# Patient Record
(demographics unavailable — no encounter records)

---

## 2024-10-09 NOTE — HISTORY OF PRESENT ILLNESS
[de-identified] : This patient presents today for initial consultation regarding complaints of left shoulder pain.  Patient had the pain for about 1 month.  Pain level 4-5 out of 10 but increased to 6 out of 10 with activity.  Reaching behind the back is painful and abducting the arm to the side is also quite painful.  The patient states that the pain is worse with activity and improved by rest. The patient denies numbness and tingling, radicular symptoms, or bowel/bladder dysfunction.  Patient takes Tylenol for the pain.  Has not had any treatment for this issue.

## 2024-10-09 NOTE — DISCUSSION/SUMMARY
[de-identified] : This patient presents for initial consultation by complaints of left shoulder pain.  Patient had the pain for about 1 month.  He has been taking Tylenol without improvement.  He has been doing a lot of manual activity cleaning at his mom's house and throwing away garbage.  This may have contributed to his impingement that we are seeing on today's office visit.  We discussed treatment options.  He is not been taking any anti-inflammatory so I recommend a course of meloxicam 15 mg each day for 2 weeks.  Instructions are given regarding taking meloxicam.  If the symptoms do not improve in the next 2 weeks I recommend MRI for further evaluation.  We will consider cortisone injection physical therapy if the MRI is negative for any rotator cuff tears.  At least 30 minutes was spent performing the evaluation and management on today's office visit.  This includes but is not limited to preparing to see patient including review of any test results or outside medical records, obtaining and/or reviewing separately obtained history, performing examination and evaluation, counseling and educating the patient on their diagnosis and treatment recommendations, ordering medications, tests, or procedures, documenting clinical information in the electronic health record, independently interpreting results (not separately reported) and communicating results to the patient, and coordination of care.

## 2024-10-09 NOTE — PHYSICAL EXAM
[de-identified] : The patient appears well nourished  and in no apparent distress.  The patient is alert and oriented to person, place, and time.   Affect and mood appear normal.    The head is normocephalic and atraumatic.  The eyes reveal normal sclera and extra ocular muscles are intact.   The neck appears normal with no jugular venous distention or masses noted.   Skin shows normal turgor with no evidence of eczema or psoriasis.  No respiratory distress noted.  The patient ambulates with a normal gait.  The left shoulder has satisfactory range of motion but there is some guarding and discomfort with terminal internal rotation and terminal abduction.  There is a positive impingement sign and positive Gleason sign..   Rotator cuff strength is normal.  There is no tenderness to palpation.   There is no soft tissue swelling.  There is no eccyhmosis.  There is no warmth or erythema.    There is no instabililty on exam to anterior drawer or load and shift.  No lymphadenopathy or edema is noted.  Pulses and capillary refill are normal.  There is no muscular atrophy.  Strength and sensation are intact distally.   [de-identified] : AP, outlet,  and glenoid and axillary views of the left shoulder were obtained.  The glenohumeral and acromioclavicular joints are well maintained without evidence of degenerative arthritis.   There is no fracture, dislocation, or subluxation.

## 2024-12-20 NOTE — HISTORY OF PRESENT ILLNESS
[Upper back] : upper back [Right Leg] : right leg [4] : 4 [Constant] : constant [Household chores] : household chores [Sleep] : sleep [Sitting] : sitting [Standing] : standing [Full time] : Work status: full time [] : no [FreeTextEntry1] : RT SIDE GREATER LT [FreeTextEntry6] : SHOCKING [FreeTextEntry7] : RT LEG GREATER THAN LT  [de-identified] : 2018 [de-identified] : PTA WAS COMPELETE IN 2018 FOR 1 YEAR OF SEASSION WITH NO IMPROVEMENT IN PAIN

## 2025-03-05 NOTE — HISTORY OF PRESENT ILLNESS
[Lower back] : lower back [7] : 7 [Constant] : constant [Rest] : rest [Meds] : meds [Walking] : walking [Full time] : Work status: full time [] : This patient has had an injection before: yes [Home] : at home, [unfilled] , at the time of the visit. [Medical Office: ([facility])___] : at the medical office located in  [Telehealth (audio & video)] : This visit was provided via telehealth using real-time 2-way audio visual technology. [Verbal consent obtained from patient] : the patient, [unfilled] [FreeTextEntry1] : RT KNEE [de-identified] : W DR STODDARD IN KNEE

## 2025-03-10 NOTE — HISTORY OF PRESENT ILLNESS
[de-identified] : Patient is a 72-year-old male who presents today for an evaluation regarding his right knee.  He states that the pain started 1 week ago on Thursday.  While trying to reach something in his truck.  He did feel a sharp pain within the right knee.  And felt some swelling the following day.  He states that since then he has had a discomfort with a slight increase in pain and swelling.  Which is worse with any strenuous activities.  Which would include standing and walking.  He did try icing it as well as taking Tylenol.  Which did provide him with some mild relief.  Currently he states the pain is a 5 on a scale of 1-10.  Denies any buckling or locking.  And states that he was doing well prior to Thursday.

## 2025-03-10 NOTE — PHYSICAL EXAM
[de-identified] : On physical examination of the right knee.  The skin is clean dry and intact with no evidence of infection superficial or deep.  There is no redness heat discharge fever or increased pain and tenderness.  However there is some joint effusion noted.  Overall he does have excellent range of motion.  There is some pain and tenderness are mostly on the medial aspect.  Neurovascularly intact with no evidence of ligamentous laxity.  However performing a squat and Alvaro's test elicited an increase in discomfort on the medial aspect.  Consistent with a possible medial meniscal tear.  Actively he does have excellent range of motion with no palpable defect nor weakness. [de-identified] : X-rays of the right knee were taken in the office today.  This included the 3 views the AP lateral and sunrise views.  It shows some mild narrowing of the joint spacing particularly on the medial femoral-tibial compartment.  No evidence of any fracture or dislocation.

## 2025-03-10 NOTE — PROCEDURE
[Aspiration] : Aspiration [Injection] : Injection [Right] : of the right [Knee Joint] : knee joint [Effusion] : Effusion [Patient] : patient [Risk] : risk [Benefits] : benefits [Alternatives] : alternatives [Bleeding] : bleeding [Infection] : infection [Allergic Reaction] : allergic reaction [Verbal Consent Obtained] : verbal consent was obtained prior to the procedure [Alcohol] : Alcohol [Lateral] : lateral [Superior] : superior [18] : an 18-gauge [___ mL Fluid] : [unfilled] mL of [Yellow] : yellow [Same Needle/New Syringe] : the syringe was changed and the same needle was left in place and [1% Lidocaine___(mL)] : [unfilled] mL of 1% Lidocaine [Methylpred. 40mg/mL___(mL)] : [unfilled] mL of 40mg/mL methylprednisolone [Bandage Applied] : a bandage [Ace Wrap] : an ace wrap [Tolerated Well] : The patient tolerated the procedure well [None] : none

## 2025-03-10 NOTE — DISCUSSION/SUMMARY
[de-identified] : After thorough history full examination and review of x-rays.  It was explained to the patient that he does have some mild narrowing on the medial joint line however the examination is compatible with a possible medial meniscal tear.  Since he did have some joint effusion with some discomfort.  He was offered a cortisone injection here in the office as well as an aspiration.  He was explained the risk benefits pros and cons as well as alternatives.  He was explained that there is no guarantee for complete relief and that if there is any relief that this may return.  He was explained that an injection is not a cure for any osteoarthritic changes nor any meniscal tear.  And therefore is used as a temporary basis to help control his symptoms.  He was explained that if this returns or persists.  An MRI of the knee is warranted to fully evaluate the articular cartilage and to assess for any meniscus tear.  He was explained that following the injection he should reassess his discomfort and if still persistent receive the MRI.  He was however advised to continue with conservative management as well including ice packs/moist heat and anti-inflammatories as needed.  35 minutes were spent, face to face, in direct consultation with the patient. This includes reviewing the natural history of their Dx., eliciting the history, performing an orthopedic exam, review of the x-ray findings, forming a differential Dx and discussing all treatment options. This Includes both surgical and non-surgical treatments. I also reviewed all the risks and benefits of non-operative & operative Tx options, future impact into orthopedic functions/problems, activity restrictions both at home and at work, and all follow up requirements.

## 2025-04-14 NOTE — HISTORY OF PRESENT ILLNESS
[Lower back] : lower back [7] : 7 [Constant] : constant [Rest] : rest [Meds] : meds [Walking] : walking [Full time] : Work status: full time [] : This patient has had an injection before: yes [Home] : at home, [unfilled] , at the time of the visit. [Medical Office: ([facility])___] : at the medical office located in  [Telehealth (audio & video)] : This visit was provided via telehealth using real-time 2-way audio visual technology. [Verbal consent obtained from patient] : the patient, [unfilled] [FreeTextEntry1] : RT KNEE [de-identified] : W DR STODDRAD IN KNEE

## 2025-07-09 NOTE — HISTORY OF PRESENT ILLNESS
[Lower back] : lower back [7] : 7 [Constant] : constant [Rest] : rest [Meds] : meds [Walking] : walking [Full time] : Work status: full time [] : This patient has had an injection before: yes [Home] : at home, [unfilled] , at the time of the visit. [Medical Office: ([facility])___] : at the medical office located in  [Telehealth (audio & video)] : This visit was provided via telehealth using real-time 2-way audio visual technology. [Verbal consent obtained from patient] : the patient, [unfilled] [FreeTextEntry1] : RT KNEE [de-identified] : W DR STODDARD IN KNEE